# Patient Record
Sex: FEMALE | Race: WHITE
[De-identification: names, ages, dates, MRNs, and addresses within clinical notes are randomized per-mention and may not be internally consistent; named-entity substitution may affect disease eponyms.]

---

## 2019-01-10 ENCOUNTER — HOSPITAL ENCOUNTER (EMERGENCY)
Dept: HOSPITAL 5 - ED | Age: 29
Discharge: HOME | End: 2019-01-10
Payer: MEDICAID

## 2019-01-10 VITALS — SYSTOLIC BLOOD PRESSURE: 136 MMHG | DIASTOLIC BLOOD PRESSURE: 59 MMHG

## 2019-01-10 DIAGNOSIS — Z88.0: ICD-10-CM

## 2019-01-10 DIAGNOSIS — I10: ICD-10-CM

## 2019-01-10 DIAGNOSIS — J02.0: Primary | ICD-10-CM

## 2019-01-10 PROCEDURE — 87430 STREP A AG IA: CPT

## 2019-01-10 PROCEDURE — 99283 EMERGENCY DEPT VISIT LOW MDM: CPT

## 2019-01-10 NOTE — EMERGENCY DEPARTMENT REPORT
HPI





- General


Chief Complaint: Sore Throat


Time Seen by Provider: 01/10/19 10:54





- HPI


HPI: 


 Patient is a 13-year-old male who presents to ED with his mother complaining of

throat pain 2 days.  Patient describes pain as throbbing in nature, 8 out of 10

intensity, nonradiating, localized to his throat.  Admits pain with swallowing 

and eating.  Patient admits no appetite due to throat pain.  Patient admits 

fever for the first 2 days but not at the moment.


Patient denies nausea/vomiting/abdominal pain/shortness of breath/chest 

pain/headache.








ED Past Medical Hx





- Past Medical History


Hx Hypertension: Yes (WITH PREGNANCY)


Hx Congestive Heart Failure: No


Hx Diabetes: No


Hx Deep Vein Thrombosis: No


Hx Renal Disease: No


Hx Sickle Cell Disease: No


Hx Seizures: No


Hx Asthma: No


Hx COPD: No


Hx HIV: No





- Surgical History


Past Surgical History?: Yes


Additional Surgical History: C section





- Social History


Smoking Status: Never Smoker


Substance Use Type: None





- Medications


Home Medications: 


                                Home Medications











 Medication  Instructions  Recorded  Confirmed  Last Taken  Type


 


Azithromycin [Zithromax TAB] 500 mg PO QDAY #7 tablet 01/10/19  Unknown Rx


 


Ibuprofen [Motrin 800 MG tab] 800 mg PO Q8HR PRN #15 tablet 01/10/19  Unknown Rx


 


Nystas/Diphen/Xyl Visc/Mylanta 15 ml PO TID PRN #120 ml 01/10/19  Unknown Rx





[Magic Mouthwash]     














ED Review of Systems


ROS: 


Stated complaint: FLU LIKE


Other details as noted in HPI





Comment: All other systems reviewed and negative


Constitutional: denies: chills, fever


Eyes: denies: eye pain, eye discharge, vision change


ENT: denies: ear pain, throat pain


Respiratory: denies: cough, shortness of breath, wheezing


Cardiovascular: denies: chest pain, palpitations


Endocrine: no symptoms reported


Gastrointestinal: denies: abdominal pain, nausea, diarrhea


Genitourinary: denies: urgency, dysuria, frequency, hematuria, discharge





Physical Exam





- Physical Exam


Vital Signs: 


                                   Vital Signs











  01/10/19





  09:40


 


Temperature 97.5 F L


 


Pulse Rate 89


 


Respiratory 16





Rate 


 


Blood Pressure 136/59


 


O2 Sat by Pulse 100





Oximetry 











Physical Exam: 


GENERAL: Alert and oriented x3, no apparent distress, Normal Gait, atraumatic.


HEAD: Head is normocephalic and a-traumatic.





NOSE: Nose symetrical, Nontender,Nares appeared normal.


MOUTH:Mouth is well hydrated and without lesions. Tonsils erythematous and 

swollen with exudate,  Uvula midline, Tongue not elevated. Mucous membranes are 

moist. Posterior pharynx exudative. Patent airways.


NECK: Supple. Non edematous, No carotid bruits.  No lymphadenopathy or 

thyromegaly.  No C-spine tenderness


LUNGS: Symetrical with respiration, CTAB.


HEART:  S1, S2 present, regular rate and rhythm without murmur, no rubs, no 

gallops. Non tender to palpation


SKIN:  Warm and dry, No lesions, No ulceration or induration present.














ED Course


                                   Vital Signs











  01/10/19





  09:40


 


Temperature 97.5 F L


 


Pulse Rate 89


 


Respiratory 16





Rate 


 


Blood Pressure 136/59


 


O2 Sat by Pulse 100





Oximetry 














ED Medical Decision Making





- Medical Decision Making





28-year-old male presents with strep pharyngitis.


ED course: Rapid strep tests ordered rapid strep test positive


Patient received 1 dose of Tylenol, 60 mg of prednisone.


Fever responsive to one dose of Tylenol.


Vital signs stable patient is in no acute or respiratory distress.


Discussed findings with patient about the positive strep.  Discussed treatment 

in ED with patient


Discussed the patient that strep throat is contagious and to limit sharing 

spoons and such.


Application to be sent home on Motrin and a couple of days worth of prednisone.


Discussed with patient follow-up with primary care physician.


Patient verbally states he understands and will comply to follow-up.


Critical care attestation.: 


If time is entered above; I have spent that time in minutes in the direct care 

of this critically ill patient, excluding procedure time.








ED Disposition


Clinical Impression: 


 Strep pharyngitis, Acute bacterial tonsillitis





Disposition: DC-01 TO HOME OR SELFCARE


Is pt being admited?: No


Does the pt Need Aspirin: No


Condition: Stable


Instructions:  Tonsillitis (ED), Strep Throat (ED)


Additional Instructions: 


Make sure to follow up with the primary care physician as discussed.


Take all your medications as you've been prescribed.


If you have any worsening symptoms or develop new symptoms please return to ED 

immediately.


Prescriptions: 


Azithromycin [Zithromax TAB] 500 mg PO QDAY #7 tablet


Ibuprofen [Motrin 800 MG tab] 800 mg PO Q8HR PRN #15 tablet


 PRN Reason: Pain


Nystas/Diphen/Xyl Visc/Mylanta [Magic Mouthwash] 15 ml PO TID PRN #120 ml


 PRN Reason: Sore Throat


Referrals: 


PRIMARY CARE,MD [Primary Care Provider] - 3-5 Days


[facility] [Outside] - 3-5 Days


[facility] [place] [Outside] - 3-5 Days


Forms:  Work/School Release Form(ED)


Time of Disposition: 11:57

## 2019-10-18 ENCOUNTER — HOSPITAL ENCOUNTER (EMERGENCY)
Dept: HOSPITAL 5 - ED | Age: 29
Discharge: HOME | End: 2019-10-18
Payer: MEDICAID

## 2019-10-18 VITALS — SYSTOLIC BLOOD PRESSURE: 126 MMHG | DIASTOLIC BLOOD PRESSURE: 78 MMHG

## 2019-10-18 DIAGNOSIS — Z88.0: ICD-10-CM

## 2019-10-18 DIAGNOSIS — J02.9: Primary | ICD-10-CM

## 2019-10-18 PROCEDURE — 99282 EMERGENCY DEPT VISIT SF MDM: CPT

## 2019-10-18 NOTE — EMERGENCY DEPARTMENT REPORT
Minor Respiratory





- HPI


Chief Complaint: Extremity Injury, Upper


Stated Complaint: SORE THROAT/LIGHT HEADED/DIZZINESS


Time Seen by Provider: 10/18/19 10:40


Duration: 1 week


Minor Respiratory: Yes Sore Throat, Yes Able to Tolerate Fluids, Yes Fever, No 

Rhinorrhea, No Ear Pain, No Cough, No Sick Contacts, No Hemoptysis, No Chest 

Pain, No Shortness of Breath


Other History: 29-year-old -American female presents to the emergency 

room complaining of sore throat headache dizziness and generalized muscle 

soreness 1 week.  Patient reports chills but unsure she's had a fever.  Denies 

any past medical history she does report an allergy to penicillin.





ED Review of Systems


ROS: 


Stated complaint: SORE THROAT/LIGHT HEADED/DIZZINESS


Other details as noted in HPI





Constitutional: chills, fever


ENT: throat pain


Neurological: headache





ED Past Medical Hx





- Past Medical History


Hx Hypertension: Yes (WITH PREGNANCY)


Hx Congestive Heart Failure: No


Hx Diabetes: No


Hx Deep Vein Thrombosis: No


Hx Renal Disease: No


Hx Sickle Cell Disease: No


Hx Seizures: No


Hx Asthma: No


Hx COPD: No


Hx HIV: No





- Surgical History


Additional Surgical History: C section





- Social History


Smoking Status: Never Smoker


Substance Use Type: None





- Medications


Home Medications: 


                                Home Medications











 Medication  Instructions  Recorded  Confirmed  Last Taken  Type


 


Azithromycin [Zithromax TAB] 500 mg PO QDAY #7 tablet 01/10/19  Unknown Rx


 


Ibuprofen [Motrin 800 MG tab] 800 mg PO Q8HR PRN #15 tablet 01/10/19  Unknown Rx


 


Nystas/Diphen/Xyl Visc/Mylanta 15 ml PO TID PRN #120 ml 01/10/19  Unknown Rx





[Magic Mouthwash]     


 


Clarithromycin [Biaxin] 250 mg PO BID #14 tab 10/18/19  Unknown Rx


 


Ibuprofen [Motrin 800 MG tab] 800 mg PO Q8HR PRN #21 tablet 10/18/19  Unknown Rx














Minor Respiratory Exam





- Exam


General: 


Vital signs noted. No distress. Alert and acting appropriately.





HEENT: Yes Pharyngeal Erythema, Yes Pharyngeal Exudates, Yes Moist Mucous 

Membranes, No Rhinorrhea, No Conjuctival Injection, No Frontal Tenderness, No 

Maxillary Tenderness


Ear: Neither TM Bulge, Neither TM Erythema, Neither EAC Pain, Neither EAC 

Discharge


Neck: Yes Adenopathy, Yes Supple


Lungs: Yes Good Air Exchange, No Wheezes, No Ronchi, No Stridor, No Cough, No 

Labored Respirations, No Retractions, No Use of Accessory Muscles, No Other 

Abnormal Lung Sounds


Heart: No Regular (tachycardia )


Abdomen: Yes Normal Bowel Sounds, No Tenderness, No Peritoneal Signs


Skin: No Rash, No Edema


Neurologic: 


Alert and oriented, no deficits.








Musculoskeletal: 


Unremarkable.











ED Course


                                   Vital Signs











  10/18/19 10/18/19





  09:51 10:13


 


Temperature 101.3 F H 101.3 F H


 


Pulse Rate 119 H 119 H


 


Respiratory 18 18





Rate  


 


Blood Pressure 126/78 126/78


 


O2 Sat by Pulse 98 98





Oximetry  














ED Medical Decision Making





- Medical Decision Making





29-year-old -American female presents to the emergency room complaining 

of sore throat headache dizziness and generalized muscle soreness 1 week.  

Patient reports chills but unsure she's had a fever.  Denies any past medical 

history she does report an allergy to penicillin.











Patient be treated for strep throat.  Since allergic to penicillin will place 

him on clarithromycin.


Critical care attestation.: 


If time is entered above; I have spent that time in minutes in the direct care 

of this critically ill patient, excluding procedure time.








ED Disposition


Clinical Impression: 


 Exudative pharyngitis





Disposition: DC-01 TO HOME OR SELFCARE


Is pt being admited?: No


Does the pt Need Aspirin: No


Condition: Stable


Instructions:  Pharyngitis (ED)


Additional Instructions: 


Completes her antibiotics pain medication as needed increase her fluid intake 

aphasia diet as tolerated


Prescriptions: 


Clarithromycin [Biaxin] 250 mg PO BID #14 tab


Ibuprofen [Motrin 800 MG tab] 800 mg PO Q8HR PRN #21 tablet


 PRN Reason: Pain , Severe (7-10)


Referrals: 


[facility] [Outside] - 3-5 Days


Forms:  Work/School Release Form(ED)